# Patient Record
(demographics unavailable — no encounter records)

---

## 2017-06-23 NOTE — NUR
RN NOTES

ADMITTED A 30Y M FROM ER WITH DX OF APPENDICITIS TRANSPORTED VIA W/C ACCOMPANIED BY TECH. 
FAMILY MEMBERS AT BEDSIDE. PT IS AWAKE ALERT ORIENTED X4 ABLE TO COMMUNICATE NEEDS, ON RA 
SHEELA WELL. VS TAKEN AND RECORDED. PER PT HE DOESN'T HAVE ANY SKIN ISSUES. NOTED WITH C/O ABD 
PAIN 6/10 THIS TIME, PT RECEIVED MORPHINE PRN AT ER, REFUSED TO RECEIVE PAIN MEDS AT THIS 
TIME. ORIENTED PT TO UNIT AND CALL LIGHT USE, ALL NEEDS ATTENDED. SAFETY MAINTAINED. CALL 
LIGHT WITHIN REACH, WILL CONT TO MONITOR

## 2017-06-23 NOTE — NUR
RN NOTES



RESTING COMFORTABLY IN BED WITH NO DISTRESS NOTED. BREATHING EVEN AND UNLABORED. VITAL SIGNS 
WNL.  COMPLAINT OF PAIN 4/10. OFFERED PAIN MEDICATION, PATIENT REFUSED. PATIENT SAID HE 
STILL CAN TOLERATE THE PAIN. NEEDS ATTENDED. KEPT CLEAN AND DRY.

## 2017-06-23 NOTE — NUR
PT AMBULATORY TO ER BED 7 PT C/O DIFUSE ABD PAIN 8/10 SINCE YESTERDAY, BLOATING 
LIKE WITH NAUSEA. PT AOX 4 RR EVEN AND UNLABORD. NO SOB NOTED. NAD NOTED. NO 
NVD AT THIS TIME. PT GOWNED AND PLACED ON MONITOR WAITING FOR MD HICKMAN.

## 2017-06-23 NOTE — NUR
MS/RN

PATIENT IS SLEEPING AT THIS TIME, AROUSABLE, APPEAR COMFORTABLE, NO SIGNS OF DISTRESS NOTED, 
CALL LIGHT IN REACH.WILL CONTINUE TO MONITOR.

## 2017-06-24 NOTE — NUR
MS/RN

PATIENT IS DOZING INTERMITTENTLY, APPEAR COMFORTABLE, NO SIGNS OF DISTRESS NOTED, MEDICATED 
WITH PAIN MEDICATION PER REQUEST, NO C/O NAUSEA/VOMITING. ALL NEEDS ATTENDED AT THIS TIME. 
WILL CONTINUE TO MONITOR.

## 2017-06-24 NOTE — NUR
DISCHARGE NOTE



PROVIDED ALL D/C PAPER WORK, EDUCATION, PRESCRIPTION AND 1 WEEK OFF WORK NOTE. PATIENT SIGN 
D/C PAPER AND BELONGINGS LIST. LAC AND L HAND IV REMOVED, MINIMAL BLEEDING, STOPPED. PATIENT 
AMBULATING WITH STEADY GAIT. PATIENT PASSING GAS, DENIES N/V. DENIES PAIN. PROVIDED DR. WATSON PHONE NUMBER FOR F/U APPOINTMENT IN 2 WEEKS. PATIENT REFUSED PENUMO VACCINE. PROVIDED 
LAP WOUND MANAGEMENT INSTRUCTIONS, NO DISCHARGE/ BLEEDING AT SITE.PT STATED WANTS TO 
AMBULATE OUT OF HOSPITAL WITH COUSIN. DR. WATSON AND DR. CABRERA BOTH SAID PATIENT STABLE 
TO GO HOME. PATIENT VERBALIZED UNDERSTANDING.

## 2017-06-24 NOTE — NUR
PATIENT LEFT ACCOMPANIED BY FAMILY. AMBULATING STEADY, STABLE. PROVIDED DRESSING CHANGE FOR 
3 LAP SITES. PT. SAID HE DOESN'T WANT TO REMOVE BANDAGE AT THIS TIME, PHOTO TAKEN OF SITES 
WITH BANDAGE.